# Patient Record
(demographics unavailable — no encounter records)

---

## 2024-10-17 NOTE — HISTORY OF PRESENT ILLNESS
[FreeTextEntry1] : INITIAL VISIT: 43 y.o. Female with Long Hx of Hashimoto's thyroiditis referred from PCP for evaluation. Patient states she was diagnosed with hypothyroidism ~18 y.ago when she delivered her son but did not require Tx until later. She was taking thyroid medication for 1 year but stopped because it was causing body shakes and nervousness.  INTERVAL HISTORY: Reports feeling well, no changes in medical history. Last visit she had missed LT4 for one week prior to blood draw resulting in elevated TSH of 15. TFTs normalized with routine use of medication.  Current regimen: LT4 75 mcg daily Takes appropriately.  Denies anterior neck pain and voice changes.  Admits to history of dysphagia, but better recently.  Thyroid sonogram January 2024: LMP 0.9 x 0.7 x 0.7 cm hypoechoic nodule, stable.

## 2024-10-17 NOTE — REVIEW OF SYSTEMS
[Neck Pain] : neck pain [Constipation] : constipation [Hot Flashes] : hot flashes [Dysphagia] : no dysphagia [Dysphonia] : no dysphonia [Chest Pain] : no chest pain [Palpitations] : no palpitations [Shortness Of Breath] : no shortness of breath [Diarrhea] : no diarrhea [Tremors] : no tremors [Depression] : no depression [Anxiety] : no anxiety [Cold Intolerance] : no cold intolerance [Heat Intolerance] : no heat intolerance

## 2024-10-17 NOTE — PHYSICAL EXAM
[Alert] : alert [Well Nourished] : well nourished [No Acute Distress] : no acute distress [Well Developed] : well developed [Normal Sclera/Conjunctiva] : normal sclera/conjunctiva [EOMI] : extra ocular movement intact [No Proptosis] : no proptosis [No Thyroid Nodules] : no palpable thyroid nodules [No Respiratory Distress] : no respiratory distress [No Accessory Muscle Use] : no accessory muscle use [Clear to Auscultation] : lungs were clear to auscultation bilaterally [Normal S1, S2] : normal S1 and S2 [Normal Rate] : heart rate was normal [Regular Rhythm] : with a regular rhythm [No Edema] : no peripheral edema [Normal Anterior Cervical Nodes] : no anterior cervical lymphadenopathy [No Tremors] : no tremors [Oriented x3] : oriented to person, place, and time [Normal Affect] : the affect was normal [Normal Mood] : the mood was normal [Acanthosis Nigricans] : no acanthosis nigricans [de-identified] : thyroid fullness

## 2024-10-17 NOTE — ASSESSMENT
[FreeTextEntry1] : 44 year old female presents today for follow-up of hypothyroidism and thyroid nodule.  1. Hashimoto's hypothyroidism -Continue LT4 75 mcg daily. -Repeat TFTs now.  2. Thyroid nodule- 9mm LMP nodule on sonogram January 2024. -Repeat thyroid sonogram January 2025.

## 2025-01-09 NOTE — PHYSICAL EXAM
[FreeTextEntry1] :   General: Cooperative, NAD HEENT: NC/AT, no carotid bruits Lungs: CTAB Chest: RRR, no murmurs Extremities: nontender, no erythema Neurological Examination: NIHSS:*** MS: AOx3. Appropriately interactive, normal affect. Speech fluent w/o paraphasic errors CN: PERLL, EOMI, V1-3 sensation intact, face symmetric, hearing intact, palate elevates symmetrically, tongue midline, SCM equal bilaterally Motor: normal bulk and tone, no tremor, rigidity or bradykinesia.  5/5 all over Sens: Intact to light touch. Reflexes: 2/4 all over, downgoing toes b/l Coord:  No dysmetria, CHELSEA intact Gait: Normal

## 2025-01-09 NOTE — REASON FOR VISIT
[Time Spent: ____ minutes] : Total time spent using  services: [unfilled] minutes. The patient's primary language is not English thus required  services. [Interpreters_IDNumber] : [id number] [TWNoteComboBox1] : Chinese

## 2025-01-09 NOTE — HISTORY OF PRESENT ILLNESS
[FreeTextEntry1] :  [facility]UR[facility] AT [place]  CC: Headaches HPI: 46 y/o F with hx of migraines, Hypothyroidism, cervical radiculopathy, here for follow-up of headaches.   6/3/24: Patient seen previously by Dr. Perez in 2020.  Per his notes:  "She describes headaches. This has been going on since 2006 after gynecologic surgery. She reports the headaches occur around the time of her menses. The last anywhere from 3-7 days. They wax and wane in intensity. When severe they are associated with nausea and photophobia."  At that time, he started her on imitrex prn.  She never followed up.    States she has been having headaches for past few years.  Now noticing more intense headaches recently in past few days.   CT head ?R CPA 1.2cm extra-axial lesion vs volume averaging artifact.  MRI Brain/IAC w/wo recommended. No acute finding.  Onset: Gradual Quality: Aching Severity: 8/10 Location: Half of the head, Right Headache duration: Hours, Can last upto 3 days Radiation: no Frequency: Typically with weather changes, cannot quantify Time of day headache occurs:  Alleviating factors: Rest Aggravating factors: Weather changes, Headache wakes patient from sleep?:  Treatments attempted: Aspirin, partial relief, Imitrex(caused dizziness)  Family history of headaches: No fam hx of headaches or aneurysms Coffee consumption:  Headache status at time of visit:  Associated Symptoms: Diaphoretic, nausea, +photophobia, +phonophobia, no focal deficits, +vertigo Vision changes: No  7/22/24: Patient requests son to translate.  Started on Nurtec for episodic migraines.  MRI Brain w/wo showed right-sided petrous ridge extra-axial enhancing mass, most compatible with a meningioma with mass effect upon the cisternal portion of the right trigeminal nerve which is bowed towards the midline.  On 7/3, she had a bad headache that lasted 3 days.  She felt she couldn't open her eyes.  She did not try the Nurtec as her pharmacy did not cover it.  Current HA frequency 2/month.    Today 1/9/25:  Started on Ubrelvy, but states pharmacy did not have the medication.  Also recommended to take Tylenol 1g prn severe migraines.  She asked for a neurosurgery consultation but did not f/u yet.  Has not had any headaches since September.  She was taking ibuprofen.

## 2025-01-09 NOTE — ASSESSMENT
[FreeTextEntry1] : 46 y/o F with hx of migraines, Hypothyroidism, cervical radiculopathy, here for evaluation of headaches. Headaches are similar to her previous migraines, seem episodic as they only typically occur every few months around weather changes.  She has not previously tolerated sumatriptan and therefore have recommended trying Nurtec and ubrelvy but she has not received.    MRI Brain w/wo showed right-sided petrous ridge extra-axial enhancing mass, most compatible with a meningioma with mass effect upon the cisternal portion of the right trigeminal nerve which is bowed towards the midline. At this time, there is no clinical correlation to suggest trigeminal neuralgia, despite her Headaches being right sided.  She is still inquiring about treatments and I have discussed that surgical resection is likely not indicated at this time given she is asymptomatic, but she could obtain an opinion from neurosurgery if she would  Headaches improved.    Migraine without Aura - Reordered Ubrelvy 100mg prn severe migraines - Tylenol ES 1g prn severe migraines  - Advised to take Ubrelvy and Tylenol at the first sign of symptoms - Headache diary - Avoidance of triggers - Monitor for signs/symptoms of Trigeminal neuralgia  R Petrous ridge meningioma - Repeat MRI Brain in July 2025

## 2025-01-09 NOTE — DATA REVIEWED
[de-identified] : CT head ?R CPA 1.2cm extra-axial lesion vs volume averaging artifact.  MRI Brain/IAC w/wo recommended. No acute finding

## 2025-02-27 NOTE — PHYSICAL EXAM
[Alert] : alert [Well Nourished] : well nourished [No Acute Distress] : no acute distress [Well Developed] : well developed [Normal Sclera/Conjunctiva] : normal sclera/conjunctiva [EOMI] : extra ocular movement intact [No Proptosis] : no proptosis [No Thyroid Nodules] : no palpable thyroid nodules [No Respiratory Distress] : no respiratory distress [No Accessory Muscle Use] : no accessory muscle use [Clear to Auscultation] : lungs were clear to auscultation bilaterally [Normal S1, S2] : normal S1 and S2 [Normal Rate] : heart rate was normal [Regular Rhythm] : with a regular rhythm [No Edema] : no peripheral edema [Normal Anterior Cervical Nodes] : no anterior cervical lymphadenopathy [No Tremors] : no tremors [Oriented x3] : oriented to person, place, and time [Normal Affect] : the affect was normal [Normal Mood] : the mood was normal [Acanthosis Nigricans] : no acanthosis nigricans [de-identified] : thyroid fullness

## 2025-02-27 NOTE — REVIEW OF SYSTEMS
[Fatigue] : fatigue [Palpitations] : palpitations [Constipation] : constipation [Recent Weight Gain (___ Lbs)] : no recent weight gain [Recent Weight Loss (___ Lbs)] : no recent weight loss [Dysphagia] : no dysphagia [Neck Pain] : no neck pain [Dysphonia] : no dysphonia [Chest Pain] : no chest pain [Shortness Of Breath] : no shortness of breath [Diarrhea] : no diarrhea [Headaches] : no headaches [Dizziness] : no dizziness [Tremors] : no tremors [Depression] : no depression [Anxiety] : no anxiety

## 2025-02-27 NOTE — ASSESSMENT
[FreeTextEntry1] : 45 year old female presents today for follow-up of hypothyroidism and thyroid nodule.  1. Hashimoto's hypothyroidism- euthyroid on replacement T4. -Continue LT4 75 mcg daily. -Repeat TFTs in 3 months.  2. Thyroid nodule- 9mm LMP nodule on sonogram January 2024. -Repeat thyroid sonogram now.  3. BP 80/60, repeat 90/62. Denies dizziness, headache, chest pain, vomiting/diarrhea. -Ensure adequate fluid intake.  -If develops symptoms, consider ER eval.

## 2025-02-27 NOTE — REASON FOR VISIT
[Follow - Up] : a follow-up visit [Hypothyroidism] : hypothyroidism [Thyroid nodule/ MNG] : thyroid nodule/ MNG [Interpreters_IDNumber] : [id number] [Interpreters_FullName] : Marcella [TWNoteComboBox1] : Bahamian

## 2025-02-27 NOTE — HISTORY OF PRESENT ILLNESS
[FreeTextEntry1] : INITIAL VISIT: 43 y.o. Female with Long Hx of Hashimoto's thyroiditis referred from PCP for evaluation. Patient states she was diagnosed with hypothyroidism ~18 y.ago when she delivered her son but did not require Tx until later. She was taking thyroid medication for 1 year but stopped because it was causing body shakes and nervousness.  INTERVAL HISTORY: Reports feeling well, no changes in medical history.   Current regimen: LT4 75 mcg daily Takes appropriately.  Denies anterior neck pain, dysphagia, SOB, and voice changes.  Thyroid sonogram January 2024: LMP 0.9 x 0.7 x 0.7 cm hypoechoic nodule, stable.

## 2025-06-23 NOTE — HISTORY OF PRESENT ILLNESS
[FreeTextEntry1] : 46 y.o. Female from [facility] follows for Hashimoto's hypothyroidism. Known for noncompliance in the past, stopping meds for no reason. Now more compliant.

## 2025-06-23 NOTE — ASSESSMENT
[FreeTextEntry1] : : Cathie [id number]  46 y.o. Female from [facility] follows for Hashimoto's hypothyroidism. Known for noncompliance in the past, stopping meds for no reason. Now more compliant.   Presents today for follow up. Denies acute issues. Reports compliance with medications.   Currently on LT4 75 mcg daily  # Long standing Hashimoto's hypothyroidism No recent BW. Claims she did it but apparently se did not.  She will do TFTs in the next few days.  As of now continue current regimen  # Subcentimeter thyroid nodule Last US 3/7/25 - stable LMP 6 mm nodule  Repeat US in 3/2026   Follow up in 4 with NP  F/u with MD in 8 months.

## 2025-06-23 NOTE — PHYSICAL EXAM
[Alert] : alert [No Acute Distress] : no acute distress [Well Developed] : well developed [Normal Voice/Communication] : normal voice communication [Normal Sclera/Conjunctiva] : normal sclera/conjunctiva [EOMI] : extra ocular movement intact [PERRL] : pupils equal, round and reactive to light [No Proptosis] : no proptosis [No Lid Lag] : no lid lag [Normal Outer Ear/Nose] : the ears and nose were normal in appearance [Normal Hearing] : hearing was normal [Normal Oropharynx] : the oropharynx was normal [No Thyroid Nodules] : no palpable thyroid nodules [No Respiratory Distress] : no respiratory distress [Clear to Auscultation] : lungs were clear to auscultation bilaterally [Normal Rate] : heart rate was normal [Regular Rhythm] : with a regular rhythm [No Edema] : no peripheral edema [Normal Bowel Sounds] : normal bowel sounds [Soft] : abdomen soft [Normal Supraclavicular Nodes] : no supraclavicular lymphadenopathy [Normal Anterior Cervical Nodes] : no anterior cervical lymphadenopathy [No Clubbing, Cyanosis] : no clubbing  or cyanosis of the fingernails [No Joint Swelling] : no joint swelling seen [Normal Reflexes] : deep tendon reflexes were 2+ and symmetric [No Tremors] : no tremors [Oriented x3] : oriented to person, place, and time [Normal Affect] : the affect was normal [Normal Mood] : the mood was normal [de-identified] : Thyroid gland feels slightly enlarged. No discrete nodules palpated

## 2025-07-10 NOTE — DATA REVIEWED
[de-identified] : CT head ?R CPA 1.2cm extra-axial lesion vs volume averaging artifact.  MRI Brain/IAC w/wo recommended. No acute finding